# Patient Record
Sex: MALE | Race: WHITE
[De-identification: names, ages, dates, MRNs, and addresses within clinical notes are randomized per-mention and may not be internally consistent; named-entity substitution may affect disease eponyms.]

---

## 2017-10-16 NOTE — RAD
INDICATION:  Fever and fatigue.



COMPARISON:  There are no prior studies available for comparison.



TECHNIQUE: Dual-energy PA  and lateral views of the chest were obtained.



FINDINGS:   The heart is within normal limits in size.



There is a large mass which projects over the posterior central aspect of the left lung

measuring 13 x 10 cm in size. This has relatively well-defined margins. The lungs are

otherwise clear. No pleural effusion is seen. 



The results of this exam were discussed with the referring clinician.



IMPRESSION:  LARGE MASS IN THE LEFT LUNG RECOMMEND A CT OF THE CHEST WITH CONTRAST FOR

FURTHER EVALUATION, IN ADDITION CONSIDER A CT OF THE ABDOMEN AND PELVIS WITH CONTRAST.

## 2017-10-16 NOTE — RAD
Indication: Lung mass.



Contrast:



Administered 91.1 ml of OMNIPAQUE 300 mg/ml



CT of the chest, abdomen and pelvis was performed after oral and IV contrast

demonstration. Coronal and sagittal reconstructed images were obtained.



The inferior thyroid lobes are unremarkable. No significant mediastinal adenopathy is

noted. The heart demonstrates no pericardial effusion.



There is a large heterogeneous mass in the lingula measuring approximately 11.6 x 9.0 x

9.1 cm. It is lobulated in nature. This just abuts the pleura. No abnormal erosion of the

bone is noted.



No other pulmonary lesions are identified. No pleural fluid is identified. The axilla

demonstrates no evidence of abnormal adenopathy.



CT of the abdomen and pelvis demonstrates liver to be normal in size. Low density lesions

consistent with water density likely represent cysts are noted. No definite solid lesions

are identified. No intrahepatic duct dilatation is noted. The gallbladder demonstrates no

calcified gallstones. No pericholecystic fluid or wall thickening is identified. The

spleen is normal in size. No adrenal lesions are noted. The kidneys demonstrate symmetric

nephrograms without hydronephrosis.



No retroperitoneal lymphadenopathy is noted. No dilated loops of bowel are noted.



CT of the pelvis demonstrates enlarged prostate. No free fluid is identified. The prostate

is enlarged. No hernias are noted. Cortical cysts are noted in the upper pole of the right

kidney.



IMPRESSION: Cysts are noted in the liver and kidneys.



Large mass well-circumscribed in the left upper lobe of the lung measuring 11.6 x 9.0 x

9.1 cm. No mediastinal or axillary adenopathy is noted.

## 2017-10-18 NOTE — ED
Tirso GANDHI Benjamin, scribed for Sathya Cortez MD on 10/16/17 at 1606 .





HPI Febrile Illness





- HPI Summary


HPI Summary: 





78yo male c/o fever, malaise, fatigue, generalized body aches for couple of 

weeks, and today finally went to his PCP for his symptoms. There pt had a CXR 

that showed a mass on his left lung. Pt was sent to ED for CT scan of his chest 

and further evaluation. 





- History of Current Complaint


Chief Complaint: EDFever


Time Seen by Provider: 10/16/17 15:13


Hx Obtained From: Patient


Onset/Duration: Started Weeks Ago, Still Present


Timing: Constant


Initial Severity: Moderate


Current Severity: Moderate


Pain Intensity: 0


Pain Scale Used: 0-10 Numeric


Associated Signs and Symptoms: Myalgia, Weakness





- Allergy/Home Medications


Allergies/Adverse Reactions: 


 Allergies











Allergy/AdvReac Type Severity Reaction Status Date / Time


 


Amoxicillin [From Augmentin] Allergy  Rash Verified 10/16/17 12:38


 


Clavulanic Acid Allergy  Rash Verified 10/16/17 12:38





[From Augmentin]     


 


Sulfa Antibiotics Allergy  Rash Verified 10/16/17 12:38














PMH/Surg Hx/FS Hx/Imm Hx


Infectious Disease History: No


Infectious Disease History: 


   Denies: Traveled Outside the [place] in Last 30 Days





- Family History


Known Family History: Positive: Hypertension





- Social History


Occupation: Retired





Review of Systems


Positive: Fever, Fatigue, Other - malaise


Eyes: Negative


ENT: Negative


Cardiovascular: Negative


Positive: Cough


Gastrointestinal: Negative


Genitourinary: Negative


Positive: no symptoms reported


Positive: Myalgia - body aches


Skin: Negative


Neurological: Negative


Psychological: Normal


All Other Systems Reviewed And Are Negative: Yes





Physical Exam


Triage Information Reviewed: Yes


Vital Signs On Initial Exam: 


 Initial Vitals











Temp Pulse Resp BP Pulse Ox


 


 98.6 F   106   16   162/89   96 


 


 10/16/17 12:36  10/16/17 12:36  10/16/17 12:36  10/16/17 12:36  10/16/17 12:36











Vital Signs Reviewed: Yes


Appearance: Positive: Well-Appearing, No Pain Distress, Well-Nourished


Skin: Positive: Warm, Skin Color Reflects Adequate Perfusion, Dry


Head/Face: Positive: Normal Head/Face Inspection


Eyes: Positive: Normal, EOMI, BREE


ENT: Positive: Normal ENT inspection, Hearing grossly normal


Neck: Positive: Supple, Nontender


Respiratory/Lung Sounds: Positive: Clear to Auscultation, Breath Sounds Present


Cardiovascular: Positive: RRR, Pulses are Symmetrical in both Upper and Lower 

Extremities


Abdomen Description: Positive: Nontender, Soft


Bowel Sounds: Positive: Present


Musculoskeletal: Positive: Strength/ROM Intact


Neurological: Positive: Sensory/Motor Intact, Alert, Oriented to Person Place, 

Time


Psychiatric: Positive: Affect/Mood Appropriate





Diagnostics





- Vital Signs


 Vital Signs











  Temp Pulse Resp BP Pulse Ox


 


 10/16/17 12:36  98.6 F  106  16  162/89  96














- Laboratory


Lab Results: 


 Lab Results











  10/16/17 10/16/17 10/16/17 Range/Units





  15:20 15:20 15:20 


 


WBC   16.3 H   (3.5-10.8)  10^3/ul


 


RBC   4.64   (4.0-5.4)  10^6/ul


 


Hgb   13.8 L   (14.0-18.0)  g/dl


 


Hct   40 L   (42-52)  %


 


MCV   85   (80-94)  fL


 


MCH   30   (27-31)  pg


 


MCHC   35   (31-36)  g/dl


 


RDW   14   (10.5-15)  %


 


Plt Count   366   (150-450)  10^3/ul


 


MPV   7 L   (7.4-10.4)  um3


 


Neut % (Auto)   86.3 H   (38-83)  %


 


Lymph % (Auto)   8.7 L   (25-47)  %


 


Mono % (Auto)   4.0   (1-9)  %


 


Eos % (Auto)   0.4   (0-6)  %


 


Baso % (Auto)   0.6   (0-2)  %


 


Absolute Neuts (auto)   14.1 H   (1.5-7.7)  10^3/ul


 


Absolute Lymphs (auto)   1.4   (1.0-4.8)  10^3/ul


 


Absolute Monos (auto)   0.7   (0-0.8)  10^3/ul


 


Absolute Eos (auto)   0.1   (0-0.6)  10^3/ul


 


Absolute Basos (auto)   0.1   (0-0.2)  10^3/ul


 


Absolute Nucleated RBC   0   10^3/ul


 


Nucleated RBC %   0   


 


INR (Anticoag Therapy)  1.20 H    (0.89-1.11)  


 


APTT  30.4    (26.0-36.3)  seconds


 


Lactic Acid    1.1  (0.5-2.0)  mmol/L











Result Diagrams: 


 10/16/17 15:20





 10/16/17 15:20


Lab Statement: Any lab studies that have been ordered have been reviewed, and 

results considered in the medical decision making process.





- Radiology


  ** CXR


Xray Interpretation: Positive (See Comments) - IMPRESSION:  LARGE MASS IN THE 

LEFT LUNG RECOMMEND A CT OF THE CHEST WITH CONTRAST FOR FURTHER EVALUATION, IN 

ADDITION CONSIDER A CT OF THE ABDOMEN AND PELVIS WITH CONTRAST.


Radiology Interpretation Completed By: Radiologist - ED physician has reviewed 

this radiology report and agrees.





- CT


  ** CT A/P W


CT Interpretation: Positive (See Comments) - IMPRESSION: Cysts are noted in the 

liver and kidneys.  Large mass well-circumscribed in the left upper lobe of the 

lung measuring 11.6 x 9.0 x 9.1 cm. No mediastinal or axillary adenopathy is 

noted.


CT Interpretation Completed By: Radiologist - ED physician has reviewed this 

radiology report and agrees.





Re-Evaluation





- Re-Evaluation


  ** First Eval


Re-Evaluation Time: 17:42


Comment: Reviewed labs and imaging reports with the pt and informed pt about 

the Oncology consult.





Course/Dx





- Course


Course Of Treatment: Reviewed pts medication and allergy lists. High blood 

pressure noted.  Spoke to DR. Hernandez (Radiologist on call) about pt's imaging 

reports at 1302 hour.  Consulted Dr. Spears (Oncologist) about pt's imaging 

reports at 1741 hour. He will come see the pt.





- Diagnoses


Provider Diagnoses: 


 Lung mass








Discharge





- Discharge Plan


Condition: Stable


Disposition: HOME


Referrals: 


Jun Spears MD [Medical Doctor] -  (PLEASE FOLLOW UP WITH DR. SPEARS FOR 

YOUR CONDITION.)





The documentation as recorded by the Tirso schaffer Benjamin accurately reflects 

the service I personally performed and the decisions made by me, Sathya Cortez MD.

## 2018-07-01 ENCOUNTER — HOSPITAL ENCOUNTER (EMERGENCY)
Dept: HOSPITAL 25 - ED | Age: 78
Discharge: HOME | End: 2018-07-01
Payer: MEDICARE

## 2018-07-01 VITALS — SYSTOLIC BLOOD PRESSURE: 132 MMHG | DIASTOLIC BLOOD PRESSURE: 86 MMHG

## 2018-07-01 DIAGNOSIS — M51.27: ICD-10-CM

## 2018-07-01 DIAGNOSIS — S09.90XA: Primary | ICD-10-CM
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
## 2018-07-01 DIAGNOSIS — Z88.3: ICD-10-CM

## 2018-07-01 DIAGNOSIS — Z88.2: ICD-10-CM

## 2018-07-01 PROCEDURE — 70450 CT HEAD/BRAIN W/O DYE: CPT

## 2018-07-01 PROCEDURE — 99282 EMERGENCY DEPT VISIT SF MDM: CPT

## 2018-07-01 PROCEDURE — 72131 CT LUMBAR SPINE W/O DYE: CPT

## 2018-07-01 NOTE — ED
Head Injury





- HPI Summary


HPI Summary: 


Patient is a 78-year-old male presenting to the ED 7 days after a mechanical 

fall sustaining a head injury to the posterior scalp and subsequently back pain 

beginning 2 days after the fall denies LOC, confusion, memory loss.  Denies any 

history of concussions.  He states he continues to have a mild headache despite 

taking 200 mg ibuprofen a day for his headache.  Continues to ambulate well, 

eating and drinking well, denies any fevers, sweats, chills, abdominal pain or 

visual disturbances.  Symptoms are aggravated with nothing and alleviated with 

nothing.  Pain is rated a 2/10 for headache and 5/10 for back pain.  He states 

he was just concerned over possibly having a concussion versus other etiology.








- History Of Current Complaint


Chief Complaint: EDBackInjuryPain


Stated Complaint: HEAD INJURY/BACK PAIN


Time Seen by Provider: 07/01/18 08:48


Hx Obtained From: Patient


Mechanism Of Injury: Direct Blow


Onset/Duration: Started Hours Ago


Onset of Pain: Minutes


Severity Currently: Mild


Severity Initially: Mild


Pain Intensity: 5


Pain Scale Used: 0-10 Numeric


Location of Head Injury: Occipital


Aggravating Factor(s): Movement


Alleviating Factor(s): Rest, Ice


Associated Signs And Symptoms: Headache





- Allergies/Home Medications


Allergies/Adverse Reactions: 


 Allergies











Allergy/AdvReac Type Severity Reaction Status Date / Time


 


amoxicillin [From Augmentin] Allergy Mild Rash Verified 07/01/18 09:38


 


clavulanic acid Allergy Mild Rash Verified 07/01/18 09:38





[From Augmentin]     


 


Sulfa (Sulfonamide Allergy Mild Rash Verified 07/01/18 09:38





Antibiotics)     











Home Medications: 


 Home Medications





Albuterol HFA INHALER* [Ventolin HFA Inhaler*] 1 - 2 puff INH Q6H PRN 07/01/18 [

History Confirmed 07/01/18]











PMH/Surg Hx/FS Hx/Imm Hx


Previously Healthy: Yes


Endocrine/Hematology History: 


   Denies: Hx Diabetes


Cardiovascular History: 


   Denies: Hx Hypertension


 History: 


   Denies: Hx Renal Disease





- Cancer History


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





- Surgical History


Surgery Procedure, Year, and Place: LEFT LUNG





- Immunization History


Hx Pertussis Vaccination: No


Immunizations Up to Date: Unable to Obtain/Confirm


Infectious Disease History: No


Infectious Disease History: 


   Denies: Traveled Outside the [place] in Last 30 Days





- Social History


Occupation: Unemployed


Lives: With Family


Alcohol Use: Rare


Hx Substance Use: No


Substance Use Type: Reports: None


Hx Tobacco Use: No


Smoking Status (MU): Never Smoked Tobacco





Review of Systems


Negative: Fever, Chills, Fatigue, Skin Diaphoresis


Negative: Photophobia, Blurred Vision, Diplopia


Negative: Palpitations, Chest Pain


Negative: Shortness Of Breath, Cough


Genitourinary: Negative


Positive: no symptoms reported, see HPI


Positive: Arthralgia


Skin: Negative


Positive: Headache


All Other Systems Reviewed And Are Negative: Yes





Physical Exam


Triage Information Reviewed: Yes


Vital Signs On Initial Exam: 


 Initial Vitals











Temp Pulse Resp BP Pulse Ox


 


 97.9 F   94   16   156/89   95 


 


 07/01/18 08:06  07/01/18 08:06  07/01/18 08:06  07/01/18 08:06  07/01/18 08:06











Vital Signs Reviewed: Yes


Appearance: Positive: Well-Appearing, Well-Nourished


Skin: Positive: Warm, Skin Color Reflects Adequate Perfusion


Head/Face: Positive: Normal Head/Face Inspection


Eyes: Positive: EOMI, BREE, Conjunctiva Clear


Neck: Positive: Supple, No Lymphadenopathy


Respiratory/Lung Sounds: Positive: Clear to Auscultation, Breath Sounds Present


Cardiovascular: Positive: RRR, Pulses are Symmetrical in both Upper and Lower 

Extremities


Musculoskeletal: Positive: Pain @ - low back pain.  Negative: Edema Left, Edema 

Right


Neurological: Positive: Sensory/Motor Intact, Alert, Oriented to Person Place, 

Time, Speech Normal


Psychiatric: Positive: Normal, Affect/Mood Appropriate


AVPU Assessment: Alert





Diagnostics





- Vital Signs


 Vital Signs











  Temp Pulse Resp BP Pulse Ox


 


 07/01/18 10:20  98.2 F  78  16  132/86  98


 


 07/01/18 08:06  97.9 F  94  16  156/89  95














- Laboratory


Lab Statement: Any lab studies that have been ordered have been reviewed, and 

results considered in the medical decision making process.





Head Injury Course/Dx


Course Of Treatment: During the course of treatment, the patient's evaluated 

for head injury.  No focal neuro deficits noted.  ANO 3.  He has a mild 

headache posteriorly, but denies any other neurologic symptoms.  Endorses 

bilateral low back pain with intermittent radiation of pain to the legs.  

Denies any bladder or bowel dysfunction or weakness.  CT spine lumbar and CT 

brain obtained.  CT brain shows no acute intracranial abnormalities.  CT lumbar 

spine shows some facet degeneration, but nothing acute and no fractures are 

noted.  Patient is made aware of these results.  I have offered a muscle relaxer

, however he declines this and would like to maintain on his ibuprofen.  I have 

encouraged him to increase to 600 mg per day for the next 3 days and follow up 

with his PCP.





- Diagnoses


Provider Diagnoses: 


 Head trauma, Acute low back pain








Discharge





- Sign-Out/Discharge


Documenting (check all that apply): Discharge/Admit/Transfer





- Discharge Plan


Condition: Stable


Disposition: HOME


Referrals: 


Izzy Lay MD [Primary Care Provider] - 


Additional Instructions: 


Tylenol 650 mg 3 times daily


Ibuprofen 600 mg 3 times daily


You may use these medications intermittently


Please follow-up with your PCP





- Billing Disposition and Condition


Condition: STABLE


Disposition: Home

## 2018-07-01 NOTE — RAD
Indication: Fall, headaches.



CT of the brain was performed without IV contrast.



Ventricular structures are midline. No midline shift is noted. The extra-axial spaces are

unremarkable. There is no evidence of intracranial mass or hemorrhage. No other high or

low density lesions are identified.



Bony calvaria, mastoid air cells and paranasal sinuses are clear.



IMPRESSION: No intracranial mass or hemorrhage is noted.

## 2018-07-01 NOTE — RAD
Indication: Back pain after fall.



CT of the lumbar spine was obtained in the axial plane. Sagittal and coronal reconstructed

images were obtained.



The vertebral bodies appear normal in height. No compression fracture is noted.



At L5-S1 there is disc space narrowing with broad-based protrusion. No fracture is noted.

Facet arthropathy is noted. No central or foraminal stenosis is noted.



At L4-L5 mild disc space narrowing is noted. Broad-based protrusion flattens the thecal

sac. No evidence of nerve root impingement is noted.



At L3-L4 broad-based protrusion flattens the thecal sac. No central or foraminal stenosis

is noted.



At L2-L3 and L1-L2 minimal degenerative disc disease is noted.



IMPRESSION: At L5-S1 there is disc space narrowing with broad-based protrusion without

definite evidence of spinal stenosis.



At L4-L5 minimal broad-based protrusion flattens the thecal sac. No central or foraminal

stenosis is noted.



Minimal broad-based protrusion at L3-L4.



No fracture of the lumbar spine is noted.

## 2019-10-31 ENCOUNTER — HOSPITAL ENCOUNTER (EMERGENCY)
Dept: HOSPITAL 25 - ED | Age: 79
Discharge: HOME | End: 2019-10-31
Payer: MEDICARE

## 2019-10-31 VITALS — SYSTOLIC BLOOD PRESSURE: 155 MMHG | DIASTOLIC BLOOD PRESSURE: 94 MMHG
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
## 2019-10-31 DIAGNOSIS — Z88.2: ICD-10-CM

## 2019-10-31 DIAGNOSIS — Z88.0: ICD-10-CM

## 2019-10-31 DIAGNOSIS — S86.911A: Primary | ICD-10-CM

## 2019-10-31 DIAGNOSIS — Z88.1: ICD-10-CM
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
## 2019-10-31 LAB
ALBUMIN SERPL BCG-MCNC: 4.1 G/DL (ref 3.2–5.2)
ALBUMIN/GLOB SERPL: 1.6 {RATIO} (ref 1–3)
ALP SERPL-CCNC: 43 U/L (ref 34–104)
ALT SERPL W P-5'-P-CCNC: 15 U/L (ref 7–52)
ANION GAP SERPL CALC-SCNC: 4 MMOL/L (ref 2–11)
AST SERPL-CCNC: 20 U/L (ref 13–39)
BASOPHILS # BLD AUTO: 0 10^3/UL (ref 0–0.2)
BUN SERPL-MCNC: 23 MG/DL (ref 6–24)
BUN/CREAT SERPL: 23 (ref 8–20)
CALCIUM SERPL-MCNC: 9.2 MG/DL (ref 8.6–10.3)
CHLORIDE SERPL-SCNC: 104 MMOL/L (ref 101–111)
EOSINOPHIL # BLD AUTO: 0.3 10^3/UL (ref 0–0.6)
GLOBULIN SER CALC-MCNC: 2.6 G/DL (ref 2–4)
GLUCOSE SERPL-MCNC: 68 MG/DL (ref 70–100)
HCO3 SERPL-SCNC: 30 MMOL/L (ref 22–32)
HCT VFR BLD AUTO: 44 % (ref 42–52)
HGB BLD-MCNC: 15.1 G/DL (ref 14–18)
INR PPP/BLD: 1.03 (ref 0.82–1.09)
LYMPHOCYTES # BLD AUTO: 1.8 10^3/UL (ref 1–4.8)
MAGNESIUM SERPL-MCNC: 2 MG/DL (ref 1.9–2.7)
MCH RBC QN AUTO: 32 PG (ref 27–31)
MCHC RBC AUTO-ENTMCNC: 35 G/DL (ref 31–36)
MCV RBC AUTO: 92 FL (ref 80–94)
MONOCYTES # BLD AUTO: 0.5 10^3/UL (ref 0–0.8)
NEUTROPHILS # BLD AUTO: 5.4 10^3/UL (ref 1.5–7.7)
NRBC # BLD AUTO: 0 10^3/UL
NRBC BLD QL AUTO: 0
PLATELET # BLD AUTO: 274 10^3/UL (ref 150–450)
POTASSIUM SERPL-SCNC: 4.7 MMOL/L (ref 3.5–5)
PROT SERPL-MCNC: 6.7 G/DL (ref 6.4–8.9)
RBC # BLD AUTO: 4.74 10^6 /UL (ref 4.18–5.48)
SODIUM SERPL-SCNC: 138 MMOL/L (ref 135–145)
WBC # BLD AUTO: 8 10^3/UL (ref 3.5–10.8)

## 2019-10-31 PROCEDURE — 83735 ASSAY OF MAGNESIUM: CPT

## 2019-10-31 PROCEDURE — 85025 COMPLETE CBC W/AUTO DIFF WBC: CPT

## 2019-10-31 PROCEDURE — 80053 COMPREHEN METABOLIC PANEL: CPT

## 2019-10-31 PROCEDURE — 36415 COLL VENOUS BLD VENIPUNCTURE: CPT

## 2019-10-31 PROCEDURE — 85610 PROTHROMBIN TIME: CPT

## 2019-10-31 PROCEDURE — 99282 EMERGENCY DEPT VISIT SF MDM: CPT

## 2019-10-31 NOTE — XMS REPORT
Continuity of Care Document (CCD)

 Created on:2019



Patient:Jessee Astudillo

Sex:Male

:1940

External Reference #:[id number]





Demographics







 Address  [address] [place] [address]

 

 Home Phone  [phone]

 

 Mobile Phone  [phone]

 

 Work Phone  [phone]

 

 Preferred Language  en

 

 Marital Status  Declined to Specify/Unknown

 

 Shinto Affiliation  Unknown

 

 Race  White

 

 Ethnic Group  Declined to Specify/Unknown









Author







 Name  Precious Boggs PA-C

 

 Address  [address] [place] [address]









Care Team Providers







 Name  Role  Phone

 

 Bella Pryor - Physician  Care Team Information   [phone]
[phone]



 Assistant    









Problems







 Active Problems  Provider  Date

 

 Allergic rhinitis due to pollen  Jonathan E. Cryer, MD  Onset: 2011

 

 Impacted cerumen  Jonathan E. Cryer, MD  Onset: 2011







Social History







 Type  Date  Description  Comments

 

 Birth Sex    Unknown  

 

 Tobacco Use  Start: Unknown  Never Smoked Cigarettes  

 

 Tobacco Use  Start: Unknown  Never Smoked Cigars  

 

 Tobacco Use  Start: Unknown  Never Smoked A Pipe  

 

 Smokeless Tobacco    Never Used Smokeless Tobacco  

 

 ETOH Use    Currently occasionally consumes alcohol  

 

 Tobacco Use  Start: Unknown  Patient has never smoked  

 

 Smoking Status  Reviewed: 19  Patient has never smoked  







Allergies, Adverse Reactions, Alerts







 Active Allergies  Reaction  Severity  Comments  Date

 

 sulfa        2011







Medications







 Active Medications  SIG  Qnty  Indications  Ordering Provider  Date

 

 Azelastine HCL  use up to 2  90ml  J30.2  Jeremy HARRISON  2019



 (Nasal)  sprays per      Cryer, MD  



       0.15% Solution  nostril 2 times a        



   day as needed.        

 

 Fluticasone  2 sprays each  6month    Jeremy HARRISON  2014



 Propionate  nostril daily      Cryer, MD  



          50mcg/Act          



 Suspension          



           

 

 Albuteral Inhaler        Unknown  

 

 Allegra Allergy        Unknown  

 

 Mucinex D        Unknown  

 

 Vitamin C        Unknown  

 

 Omeprazole        Unknown  

 

 Proscar  once daily      Izzy Lay,  



       5mg Tablets        M.D.  



           

 

 Klonopin  1/2 tab at      Unknown  



        0.5mg Tablets  bedtime        



           

 

 Advair Diskus  Inhale 1 puff By      Unknown  



   Mouth Two Times        



 250-50mcg/Dose  Daily        



 Aerosol          



           

 

 Dutasteride  Take 1 Capsule By      Unknown  



           0.5mg  Mouth Every Day        



 Capsules  as Directed        



           

 

 Alrex  Instill 1 Drop In      Unknown  



     0.2% Suspension  Each Eye Two        



   Times Daily        

 

 Escitalopram Oxalate  Take 1 Tablet By      Unknown  



   Mouth Every Day        



 10mg Tablets          



           

 

 Advair Diskus        Izzy Lay,  



         M.D.  



 250-50mcg/Dose          



 Aerosol          



           







Immunizations







 Description

 

 No Information Available







Vital Signs







 Date  Vital  Result  Comment

 

 2019  9:02am  Weight  165.00 lb  









 Weight  74.844 kg  

 

 Height  67 inches  5'7"

 

 Height in cm's  170.2 cm  

 

 BMI (Body Mass Index)  25.8 kg/m2  









 2019  9:55am  Weight  165.00 lb  









 Weight  74.844 kg  

 

 Height  67 inches  5'7"

 

 Height in cm's  170.2 cm  

 

 BMI (Body Mass Index)  25.8 kg/m2  







Results







 Description

 

 No Information Available







Procedures







 Date  Code  Description  Status

 

 2019  41934  Nasal Endoscopy, Diagnostic  Completed







Medical Devices







 Description

 

 No Information Available







Encounters







 Type  Date  Location  Provider  Dx  Diagnosis

 

 Office Visit  2019  [place]  Precious Boggs  J30.2  Other seasonal



   9:00a  08  [facility]    allergic rhinitis

 

 Office Visit  2019  [place]  Jeremy HARRISON  H61.23  Impacted cerumen,



   10:00a  08  Cryer, MD    bilateral









 J30.2  Other seasonal allergic rhinitis







Assessments







 Date  Code  Description  Provider

 

 2019  J30.2  Other seasonal allergic rhinitis  Precious Boggs PA-C

 

 2019  H61.23  Impacted cerumen, bilateral  Jonathan E. Cryer, MD

 

 2019  J30.2  Other seasonal allergic rhinitis  Jonathan E. Cryer, MD







Plan of Treatment

2019 - SHAE GrossCJ30.2 Other seasonal allergic rhinitisNew 
Medication:Azelastine HCL (Nasal) 0.15 % - use up to 2 sprays per nostril 2 
times a day as needed.



Functional Status







 Description

 

 No Information Available







Mental Status







 Description

 

 No Information Available







Referrals







 Description

 

 No Information Available

## 2019-10-31 NOTE — ED
Lower Extremity





- HPI Summary


HPI Summary: 


Pt. is a 79 y.o male who presents to the ER for right calf pain and cramping x 

several days. Pt. notes he recently got back from a trip to [place]. Notes a lot 

of walking but denies injury or falls. Pt. notes pain to calf has become more 

constant and is exacerbated by walking. Also notes cramping to calf when 

sleeping. Pt. denies cp or sob. No hx of dvt. Sxs are mild in severity. No 

current modifying factors.








- History of Current Complaint


Chief Complaint: EDExtremityLower


Stated Complaint: PAIN IN RT LEG PER PT


Time Seen by Provider: 10/31/19 14:18


Hx Obtained From: Patient


Pain Intensity: 1





- Allergies/Home Medications


Allergies/Adverse Reactions: 


 Allergies











Allergy/AdvReac Type Severity Reaction Status Date / Time


 


amoxicillin [From Augmentin] Allergy Mild Rash Verified 10/31/19 14:45


 


clavulanic acid Allergy Mild Rash Verified 10/31/19 14:45





[From Augmentin]     


 


Sulfa (Sulfonamide Allergy Mild Rash Verified 10/31/19 14:45





Antibiotics)     














PMH/Surg Hx/FS Hx/Imm Hx


Previously Healthy: Yes


Endocrine/Hematology History: 


   Denies: Hx Diabetes


Cardiovascular History: 


   Denies: Hx Hypertension


 History: 


   Denies: Hx Renal Disease





- Cancer History


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





- Surgical History


Surgery Procedure, Year, and Place: LEFT LUNG - LOBECTOMY 2017


Infectious Disease History: No


Infectious Disease History: Reports: Traveled Outside the [place] in Last 30 Days





- Family History


Known Family History: Positive: Non-Contributory





- Social History


Occupation: Retired


Lives: With Family


Alcohol Use: Rare


Hx Substance Use: No


Substance Use Type: Reports: None


Hx Tobacco Use: No


Smoking Status (MU): Never Smoked Tobacco





Review of Systems


Constitutional: Negative


Cardiovascular: Negative


Respiratory: Negative


Positive: Other - right calf pain and cramping


Skin: Negative


Neurological: Negative


All Other Systems Reviewed And Are Negative: Yes





Physical Exam


Triage Information Reviewed: Yes


Vital Signs On Initial Exam: 


 Initial Vitals











Temp Pulse Resp BP Pulse Ox


 


 96.6 F   77   18   177/103   97 


 


 10/31/19 13:47  10/31/19 13:47  10/31/19 13:47  10/31/19 13:47  10/31/19 13:47











Vital Signs Reviewed: Yes


Appearance: Positive: Well-Appearing - Pt. sitting in chair in NAD> Pleasant.


Skin: Positive: Warm, Dry


Head/Face: Positive: Normal Head/Face Inspection


Eyes: Positive: Normal, EOMI


Musculoskeletal: Positive: Other - Tenderness to left lower leg. No edema, 

wounds or erythema. No knee or ankle tenderness.


Neurological: Positive: Normal, CN Intact II-III


Psychiatric: Positive: Affect/Mood Appropriate





Procedures





- Sedation


Patient Received Moderate/Deep Sedation with Procedure: No





Diagnostics





- Vital Signs


 Vital Signs











  Temp Pulse Resp BP Pulse Ox


 


 10/31/19 13:47  96.6 F  77  18  177/103  97














- Laboratory


Result Diagrams: 


 10/31/19 15:32





 10/31/19 15:32


Lab Statement: Any lab studies that have been ordered have been reviewed, and 

results considered in the medical decision making process.





Lower Extremity Course/Dx





- Course


Course Of Treatment: Pt. with right calf pain and cramping after recent plan 

trip. NO signs of infection or injury on exam. Pt. labs and u/s ordered.  Labs 

unremarkable.  IMPRESSION:  NO DVT VISUALIZED. HOWEVER SLOW STAGNANT FLOW IS 

VISUALIZED IN THE POPLITEAL.  AND POSTERIOR TIBIAL VEINS.  Suspect muscle 

strain. Advised stretching, heat and light activity. Advised repeat u/s in 2 

weeks if pain persist. Pt. understands and agrees with plan.





- Diagnoses


Differential Diagnosis/HQI/PQRI: Positive: Arthritis, Cellulitis, Contusion, DVT

, Sprain, Strain


Provider Diagnoses: 


 Muscle cramp, Muscle strain








Discharge ED





- Sign-Out/Discharge


Documenting (check all that apply): Patient Departure





- Discharge Plan


Condition: Good


Disposition: HOME


Patient Education Materials:  Leg Cramps (ED)


Referrals: 


Izzy Lay MD [Primary Care Provider] - 


Additional Instructions: 


Follow up with your PCP for repeat ultrasound in 1-2 weeks


Increase fluids


Gentle stretching and massage to leg


Tylenol for pain as directed


Return to ER if symptoms change or worsen





- Billing Disposition and Condition


Condition: GOOD


Disposition: Home





- Attestation Statements


Provider Attestation: 





 I was available for consultation for this patient. I did not evaluate the 

patient or participate in any medical decision making or disposition decisions 

unless I am specifically named in the chart as having consulted on the patient. 

If I have consulted on the patient, please see my own ED note on the patient 

encounter. Tee yLnch MD